# Patient Record
(demographics unavailable — no encounter records)

---

## 2019-08-12 NOTE — XRAY REPORT
CHEST 1 VIEW 8/12/2019 12:00 PM



INDICATION / CLINICAL INFORMATION:

Seizure. Altered mental status. Status post CVA.



COMPARISON: 

None available.



FINDINGS:



SUPPORT DEVICES: None.



HEART / MEDIASTINUM: No significant abnormality. 



LUNGS / PLEURA: No significant pulmonary or pleural abnormality. Small granuloma at the left lung bas
e. No significant pleural effusion. No pneumothorax. 



ADDITIONAL FINDINGS: No significant additional findings.



IMPRESSION:

1. No acute findings.



Signer Name: ANNA Herron MD 

Signed: 8/12/2019 12:47 PM

 Workstation Name: [id number]

## 2019-08-12 NOTE — HISTORY AND PHYSICAL REPORT
History of Present Illness


Chief complaint: 





She cant walk, talk, and she is weak on the right side


History of present illness: 


62 YO Female with BrCA, CVA, HTN, Nicotine Dependence, Asthma presents to ED for

evaluation. Pt is confused and unable to provide detailed history. Pt history is

provided by family members who are at bedside. As per family, the patient was in

her usual state of health at bedtime around 2245hrs. Upon waking from sleep this

morning- the patient complained of headache.  The patient was also  noticed by 

family members to have confusion, unsteady gait, and difficulty speaking, as 

well as unable to  objects in her right hand. Pt transported to [facility] via 

private vehicle. Pt seen and evaluated in ED and found to have symptoms 

consistent with Acute CVA, Encephalopathy. Pt admitted to telemetry and 

initiated on CVA Protocol. Pt transported to CT scan for imaging and was found 

to exhibit seizure activity. Pt treated with antiepileptic therapy. Pt is 

lethargic but is able to protect her airway. No further history obtainable. Pt 

admitted to IMCU. Prior admission on 1/18/16 reviewed. All listed medication 

reconciled at time of admission. 30 minutes additional time dedicated to 

discussing care plan with family. Pt family acknowledge understanding and 

agreement with care plan. 





Past History


Past Medical History: cancer, hypertension, stroke


Past Surgical History: mastectomy


Social history: , smoking


Family history: hypertension





Medications and Allergies


                                    Allergies











Allergy/AdvReac Type Severity Reaction Status Date / Time


 


No Known Allergies Allergy   Unverified 01/18/16 14:20











                                Home Medications











 Medication  Instructions  Recorded  Confirmed  Last Taken  Type


 


hydroCHLOROthiazide [HCTZ] 25 mg PO QDAY 08/12/19 08/12/19 Unknown History











Active Meds: 


Active Medications





Nitroglycerin (Nitrostat)  0.4 mg SL .Q5MIN PRN


   PRN Reason: Chest Pain











Review of Systems


ROS unobtainable: due to mental status





Exam





- Constitutional


Vitals: 


                                        











Temp Pulse Resp BP Pulse Ox


 


    63   13   153/92   99 


 


    08/12/19 12:45  08/12/19 12:45  08/12/19 12:45  08/12/19 12:45











General appearance: Present: mild distress, cachectic





- EENT


Eyes: Present: miosis


ENT: hearing intact, clear oral mucosa





- Neck


Neck: Present: supple, normal ROM





- Respiratory


Respiratory effort: normal


Respiratory: bilateral: CTA





- Cardiovascular


Heart Sounds: Present: S1 & S2.  Absent: rub, click





- Extremities


Extremities: pulses symmetrical, No edema


Peripheral Pulses: within normal limits





- Abdominal


General gastrointestinal: Present: soft, non-tender, non-distended, normal bowel

sounds


Female genitourinary: Present: normal





- Integumentary


Integumentary: Present: clear, warm, dry





- Musculoskeletal


Musculoskeletal: gait normal, strength equal bilaterally





- Psychiatric


Psychiatric: no appropriate mood/affect, no intact judgment & insight, no memory

intact





- Neurologic


Neurologic: CNII-XII intact, focal deficits, moves all extremities, no gait 

normal





Results





- Labs


CBC & Chem 7: 


                                 08/12/19 11:59





                                 08/12/19 11:59


Labs: 


                              Abnormal lab results











  08/12/19 08/12/19 Range/Units





  11:59 12:08 


 


Thrombin Time  14.9 L   (15.1-19.6)  Sec.


 


Acetaminophen   < 5.0 L  (10.0-30.0)  ug/mL














Assessment and Plan





- Patient Problems


(1) CVA (cerebral vascular accident)


Current Visit: No   Status: Acute   


Qualifiers: 


   Laterality of affected vessel: unspecified 


Plan to address problem: 


CVA Protocol: Admit to IMCU, CT head, MRI Brain, MRA Brain, Echo, Carotid 

Doppler, PT/OT/Speech Therapy, Antiplatelet therapy, lipid panel, statin 

therapy, seizure precautions. neuro checks.








(2) Right hemiparesis


Current Visit: Yes   Status: Acute   


Plan to address problem: 


PT consulted, 








(3) Seizure disorder


Current Visit: Yes   Status: Acute   


Plan to address problem: 


Keppra loading in ED, EEG, Neurology consulted, thyroid panel, seizure 

precautions, neuro checks








(4) Encephalopathy


Current Visit: Yes   Status: Acute   


Plan to address problem: 


CT head, neuro checks, seizure precautions, thyroid panel, Urine Drug screen








(5) DVT prophylaxis


Current Visit: Yes   Status: Acute   


Plan to address problem: 


SCD to BLE while in bed, prophylactic heparin

## 2019-08-12 NOTE — VASCULAR LAB REPORT
VL carotid duplex BILAT



INDICATION / CLINICAL INFORMATION:

stroke.



COMPARISON:

None available.



FINDINGS:

Mild intimal thickening but only minimal plaque formation at both bifurcations. Velocity measurements
 and waveform analysis indicate less than 50% stenosis of each internal carotid artery, according to 
massive criteria.



Normal antegrade flow is demonstrated in both vertebral arteries.



IMPRESSION:

1. No significant stenosis. 

 



Signer Name: Martin Sheffield MD 

Signed: 8/12/2019 7:02 PM

 Workstation Name: [id number]

## 2019-08-12 NOTE — CAT SCAN REPORT
CT HEAD WITHOUT CONTRAST



INDICATION : Stroke symptoms.  Dizziness



TECHNIQUE:  Axial imaging performed from the skull apex through the skull base without the use of con
trast.  Sagittal and coronal reformatted images.  All CT scans at this location are performed using C
T dose reduction for ALARA by means of automated exposure control. 



COMPARISON:  None



FINDINGS:  



Parenchyma:  No evidence for acute ischemia, hemorrhage or mass. A chronic 8 mm left pontine infarct 
is identified. The remaining brain parenchyma is within normal limits.

Ventricles:  Ventricles are normal in size and appear symmetric.   

Bones:  No acute osseous abnormality.   

Sinuses:  Sinuses and mastoid air cells are clear.



Soft tissues:  Soft tissues including the orbits appear normal.   



IMPRESSION: No acute abnormality. Chronic left pontine lacunar infarct.



These findings were discussed with Dr. Tejeda in the emergency department at 1216 hours [facility].



Signer Name: Elvin Olivas Jr, MD 

Signed: 8/12/2019 12:18 PM

 Workstation Name: [id number]

## 2019-08-12 NOTE — EMERGENCY DEPARTMENT REPORT
Blank Doc





- Documentation


Documentation: 





63-year-old female that presents with headache, AMS, and right sided weakness.  

Started this morning.





This initial assessment/diagnostic orders/clinical plan/treatment(s) is/are 

subject to change based on patient's health status, clinical progression and re-

assessment by fellow clinical providers in the ED.  Further treatment and workup

at subsequent clinical providers discretion.  Patient/guardians urged not to 

elope from the ED as their condition may be serious if not clinically assessed 

and managed.  Initial orders include:


1- Patient sent to MAIN ED for further evaluation and treatment


2- code stroke protocol

## 2019-08-12 NOTE — EMERGENCY DEPARTMENT REPORT
ED General Adult HPI





- General


Chief complaint: Neuro Symptoms/Deficit


Stated complaint: LIGHTHEADED/DIZZY


Time Seen by Provider: 08/12/19 11:42


Source: patient, family, RN notes reviewed, old records reviewed


Mode of arrival: Wheelchair


Limitations: Physical Limitation





- History of Present Illness


Initial comments: 





This is a 63-year-old female.  The patient is not known to this provider 

previously.  She can't remember the name of her primary care doctor.  Her past 

medical history includes bilateral breast cancer, mastectomy, reconstruction, 

stroke, hypertension, tobacco dependency.





The patient is brought to the hospital by her family for generalized weakness.  

As per verbal report from family, patient typically walks with a walker, and was

in her usual state of health.  Apparently, the patient woke up this morning, 

with a complaint of feeling off balance.  She also complained of a headache.  

The patient is not able to describe the nature of the headache to this provider.

 She was called as a code stroke in triage.


Reportedly, while in the CAT scanner, the patient had a seizure, it was 

nontraumatic, and described as generalized tonic-clonic.  It is now resolved, 

and her Accu-Chek is within normal limits.  Upon arrival back to the ER, the 

patient is awake, but postictal, protecting her airway, and moves 4 extremities 

in response to painful stimuli.








The patient then woke up.  The patient is a poor historian.  She tells me that 

her "heart" is hurting her for 2 weeks.  However, she is not able to describe 

the nature of the pain.  She does not describe radiation, or qualitative nature,

exacerbating or relieving factors.  When asked why she did not seek medical 

attention if she's been having heart pain for 2-3 weeks, the patient was not 

able to provide a lucid response.  Her family states that she's been taking her 

medicines, and she goes to [facility] pharmacy, but they can't recall the name of her 

primary care doctor.  Her family indicates that the patient may have had a 

headache earlier on today, but they're not sure.  The family does not know all 

the patient's medications.  The patient is a poor historian, but denies other 

physical pain at this time.  The patient was seen and evaluated by stroke 

neurology, who deemed the patient to not be a TPA candidate as her last known 

well time was greater than 4.5 hours prior to presentation, and reportedly, her 

symptoms and exam had improved upon their evaluation.  In addition, emergency 

angiographic imaging was not recommended, given the patient's improved exam on 

neurology evaluation.








The patient is resting her stretcher at this time.


-: unknown


Location: chest


Radiation: other


Quality: other


Consistency: other


Improves with: other


Worsens with: other





- Related Data


                                Home Medications











 Medication  Instructions  Recorded  Confirmed  Last Taken


 


hydroCHLOROthiazide [HCTZ] 25 mg PO QDAY 08/12/19 08/12/19 Unknown











                                    Allergies











Allergy/AdvReac Type Severity Reaction Status Date / Time


 


No Known Allergies Allergy   Unverified 01/18/16 14:20














ED Review of Systems


ROS: 


Stated complaint: LIGHTHEADED/DIZZY


Other details as noted in HPI





Comment: Unobtainable due to pts medical conditions


Cardiovascular: chest pain


Neurological: confusion





ED Past Medical Hx





- Past Medical History


Previous Medical History?: Yes


Hx Hypertension: Yes


Hx Asthma: Yes





- Surgical History


Past Surgical History?: No


Additional Surgical History: Radical mastectomy





- Social History


Smoking Status: Current Every Day Smoker





- Medications


Home Medications: 


                                Home Medications











 Medication  Instructions  Recorded  Confirmed  Last Taken  Type


 


hydroCHLOROthiazide [HCTZ] 25 mg PO QDAY 08/12/19 08/12/19 Unknown History














ED Physical Exam





- General


Limitations: Other (patient initially postictal, after resolution of postictal 

status, patient is a poor historian)


General appearance: alert, in no apparent distress





- Head


Head exam: Present: atraumatic, normocephalic





- Eye


Eye exam: Present: normal appearance, EOMI.  Absent: nystagmus





- ENT


ENT exam: Present: normal exam, normal orophraynx, mucous membranes moist, 

normal external ear exam





- Neck


Neck exam: Present: normal inspection, full ROM.  Absent: tenderness, 

meningismus





- Respiratory


Respiratory exam: Present: normal lung sounds bilaterally.  Absent: respiratory 

distress





- Cardiovascular


Cardiovascular Exam: Present: normal rhythm, normal heart sounds.  Absent: 

tachycardia, irregular rhythm, systolic murmur, diastolic murmur, rubs, gallop





- GI/Abdominal


GI/Abdominal exam: Present: soft.  Absent: distended, tenderness, guarding, 

rebound, rigid, pulsatile mass





- Extremities Exam


Extremities exam: Present: normal inspection, full ROM, other (2+ pulses noted 

in the bilateral upper, lower extremities.  Compartments soft.  No long bony 

tenderness.  The pelvis is stable.).  Absent: pedal edema, joint swelling, calf 

tenderness





- Back Exam


Back exam: Present: normal inspection, full ROM.  Absent: tenderness, CVA 

tenderness (R), CVA tenderness (L), paraspinal tenderness, vertebral tenderness





- Neurological Exam


Neurological exam: Present: alert, other (Extraocular movements intact.  Tongue 

midline.  No facial droop.  Facial sensation intact to light touch in the V1, 

V2, V3 distribution bilaterally.  5 and 5 strength in 4 extremities..  Sensation

 is intact to light touch in 4 extremities.).  Absent: motor sensory deficit





- Psychiatric


Psychiatric exam: Present: anxious





- Skin


Skin exam: Present: warm, dry, intact, normal color.  Absent: rash





ED Course


                                   Vital Signs











  08/12/19 08/12/19 08/12/19





  12:13 12:15 12:30


 


Temperature   


 


Pulse Rate 53 L 59 L 55 L


 


Respiratory 12 13 13





Rate   


 


Blood Pressure  145/88 152/87


 


O2 Sat by Pulse 97 98 99





Oximetry   














  08/12/19 08/12/19





  12:45 13:14


 


Temperature  98.4 F


 


Pulse Rate 63 


 


Respiratory 13 





Rate  


 


Blood Pressure 153/92 


 


O2 Sat by Pulse 99 





Oximetry  














ED Medical Decision Making





- Lab Data


Result diagrams: 


                                 08/12/19 11:59





                                 08/12/19 11:59








                                   Vital Signs











  08/12/19 08/12/19 08/12/19





  12:13 12:15 12:30


 


Pulse Rate 53 L 59 L 55 L


 


Respiratory 12 13 13





Rate   


 


Blood Pressure  145/88 152/87


 


O2 Sat by Pulse 97 98 99





Oximetry   














  08/12/19





  12:45


 


Pulse Rate 63


 


Respiratory 13





Rate 


 


Blood Pressure 153/92


 


O2 Sat by Pulse 99





Oximetry 











                                   Lab Results











  08/12/19 08/12/19 08/12/19 Range/Units





  11:52 11:59 11:59 


 


WBC   7.0   (4.5-11.0)  K/mm3


 


RBC   4.31   (3.65-5.03)  M/mm3


 


Hgb   13.4   (10.1-14.3)  gm/dl


 


Hct   40.4   (30.3-42.9)  %


 


MCV   94   (79-97)  fl


 


MCH   31   (28-32)  pg


 


MCHC   33   (30-34)  %


 


RDW   13.3   (13.2-15.2)  %


 


Plt Count   411   (140-440)  K/mm3


 


Lymph % (Auto)   33.3   (13.4-35.0)  %


 


Mono % (Auto)   7.0   (0.0-7.3)  %


 


Eos % (Auto)   2.0   (0.0-4.3)  %


 


Baso % (Auto)   0.8   (0.0-1.8)  %


 


Lymph #   2.3   (1.2-5.4)  K/mm3


 


Mono #   0.5   (0.0-0.8)  K/mm3


 


Eos #   0.1   (0.0-0.4)  K/mm3


 


Baso #   0.1   (0.0-0.1)  K/mm3


 


Seg Neutrophils %   56.9   (40.0-70.0)  %


 


Seg Neutrophils #   4.0   (1.8-7.7)  K/mm3


 


PT    13.7  (12.2-14.9)  Sec.


 


INR    1.08  (0.87-1.13)  


 


APTT    28.2  (24.2-36.6)  Sec.


 


Thrombin Time    14.9 L  (15.1-19.6)  Sec.


 


Sodium     (137-145)  mmol/L


 


Potassium     (3.6-5.0)  mmol/L


 


Chloride     ()  mmol/L


 


Carbon Dioxide     (22-30)  mmol/L


 


Anion Gap     mmol/L


 


BUN     (7-17)  mg/dL


 


Creatinine     (0.7-1.2)  mg/dL


 


Estimated GFR     ml/min


 


BUN/Creatinine Ratio     %


 


Glucose     ()  mg/dL


 


POC Glucose  90    ()  


 


Calcium     (8.4-10.2)  mg/dL


 


Magnesium     (1.7-2.3)  mg/dL


 


Total Bilirubin     (0.1-1.2)  mg/dL


 


AST     (5-40)  units/L


 


ALT     (7-56)  units/L


 


Alkaline Phosphatase     ()  units/L


 


Total Creatine Kinase     ()  units/L


 


CK-MB (CK-2)     (0.0-4.0)  ng/mL


 


CK-MB (CK-2) Rel Index     (0-4)  


 


Troponin T     (0.00-0.029)  ng/mL


 


Total Protein     (6.3-8.2)  g/dL


 


Albumin     (3.9-5)  g/dL


 


Albumin/Globulin Ratio     %


 


Salicylates     (2.8-20.0)  mg/dL


 


Acetaminophen     (10.0-30.0)  ug/mL


 


Plasma/Serum Alcohol     (0-0.07)  %














  08/12/19 08/12/19 08/12/19 Range/Units





  11:59 12:08 12:08 


 


WBC     (4.5-11.0)  K/mm3


 


RBC     (3.65-5.03)  M/mm3


 


Hgb     (10.1-14.3)  gm/dl


 


Hct     (30.3-42.9)  %


 


MCV     (79-97)  fl


 


MCH     (28-32)  pg


 


MCHC     (30-34)  %


 


RDW     (13.2-15.2)  %


 


Plt Count     (140-440)  K/mm3


 


Lymph % (Auto)     (13.4-35.0)  %


 


Mono % (Auto)     (0.0-7.3)  %


 


Eos % (Auto)     (0.0-4.3)  %


 


Baso % (Auto)     (0.0-1.8)  %


 


Lymph #     (1.2-5.4)  K/mm3


 


Mono #     (0.0-0.8)  K/mm3


 


Eos #     (0.0-0.4)  K/mm3


 


Baso #     (0.0-0.1)  K/mm3


 


Seg Neutrophils %     (40.0-70.0)  %


 


Seg Neutrophils #     (1.8-7.7)  K/mm3


 


PT     (12.2-14.9)  Sec.


 


INR     (0.87-1.13)  


 


APTT     (24.2-36.6)  Sec.


 


Thrombin Time     (15.1-19.6)  Sec.


 


Sodium  143    (137-145)  mmol/L


 


Potassium  3.6    (3.6-5.0)  mmol/L


 


Chloride  101.2    ()  mmol/L


 


Carbon Dioxide  30    (22-30)  mmol/L


 


Anion Gap  15    mmol/L


 


BUN  12    (7-17)  mg/dL


 


Creatinine  0.8    (0.7-1.2)  mg/dL


 


Estimated GFR  > 60    ml/min


 


BUN/Creatinine Ratio  15    %


 


Glucose  88    ()  mg/dL


 


POC Glucose     ()  


 


Calcium  9.1    (8.4-10.2)  mg/dL


 


Magnesium   2.20   (1.7-2.3)  mg/dL


 


Total Bilirubin  0.30    (0.1-1.2)  mg/dL


 


AST  18    (5-40)  units/L


 


ALT  13    (7-56)  units/L


 


Alkaline Phosphatase  69    ()  units/L


 


Total Creatine Kinase  92  103   ()  units/L


 


CK-MB (CK-2)  1.1    (0.0-4.0)  ng/mL


 


CK-MB (CK-2) Rel Index  1.1    (0-4)  


 


Troponin T  < 0.010    (0.00-0.029)  ng/mL


 


Total Protein  7.3    (6.3-8.2)  g/dL


 


Albumin  4.1    (3.9-5)  g/dL


 


Albumin/Globulin Ratio  1.3    %


 


Salicylates    7.4  (2.8-20.0)  mg/dL


 


Acetaminophen     (10.0-30.0)  ug/mL


 


Plasma/Serum Alcohol     (0-0.07)  %














  08/12/19 08/12/19 Range/Units





  12:08 12:08 


 


WBC    (4.5-11.0)  K/mm3


 


RBC    (3.65-5.03)  M/mm3


 


Hgb    (10.1-14.3)  gm/dl


 


Hct    (30.3-42.9)  %


 


MCV    (79-97)  fl


 


MCH    (28-32)  pg


 


MCHC    (30-34)  %


 


RDW    (13.2-15.2)  %


 


Plt Count    (140-440)  K/mm3


 


Lymph % (Auto)    (13.4-35.0)  %


 


Mono % (Auto)    (0.0-7.3)  %


 


Eos % (Auto)    (0.0-4.3)  %


 


Baso % (Auto)    (0.0-1.8)  %


 


Lymph #    (1.2-5.4)  K/mm3


 


Mono #    (0.0-0.8)  K/mm3


 


Eos #    (0.0-0.4)  K/mm3


 


Baso #    (0.0-0.1)  K/mm3


 


Seg Neutrophils %    (40.0-70.0)  %


 


Seg Neutrophils #    (1.8-7.7)  K/mm3


 


PT    (12.2-14.9)  Sec.


 


INR    (0.87-1.13)  


 


APTT    (24.2-36.6)  Sec.


 


Thrombin Time    (15.1-19.6)  Sec.


 


Sodium    (137-145)  mmol/L


 


Potassium    (3.6-5.0)  mmol/L


 


Chloride    ()  mmol/L


 


Carbon Dioxide    (22-30)  mmol/L


 


Anion Gap    mmol/L


 


BUN    (7-17)  mg/dL


 


Creatinine    (0.7-1.2)  mg/dL


 


Estimated GFR    ml/min


 


BUN/Creatinine Ratio    %


 


Glucose    ()  mg/dL


 


POC Glucose    ()  


 


Calcium    (8.4-10.2)  mg/dL


 


Magnesium    (1.7-2.3)  mg/dL


 


Total Bilirubin    (0.1-1.2)  mg/dL


 


AST    (5-40)  units/L


 


ALT    (7-56)  units/L


 


Alkaline Phosphatase    ()  units/L


 


Total Creatine Kinase    ()  units/L


 


CK-MB (CK-2)    (0.0-4.0)  ng/mL


 


CK-MB (CK-2) Rel Index    (0-4)  


 


Troponin T    (0.00-0.029)  ng/mL


 


Total Protein    (6.3-8.2)  g/dL


 


Albumin    (3.9-5)  g/dL


 


Albumin/Globulin Ratio    %


 


Salicylates    (2.8-20.0)  mg/dL


 


Acetaminophen  < 5.0 L   (10.0-30.0)  ug/mL


 


Plasma/Serum Alcohol   < 0.01  (0-0.07)  %














- EKG Data


-: EKG Interpreted by Me


EKG shows normal: sinus rhythm


Rate: bradycardia





- EKG Data





08/12/19 13:21


This is a sinus bradycardia, 40 in atraumatic, normal axis, QTC within normal 

limits, poor R wave progression, EKG is not consistent with ST elevation 

myocardial infarction.





- Radiology Data


Radiology results: report reviewed, image reviewed





Noncontrast CT scan of the brain is negative for acute disease.





X-ray of the chest is negative for acute disease.





- Medical Decision Making





Differential diagnosis, including not limited to: Stroke, TIA, seizure, 

pneumonia, urinary tract infection, acute coronary syndrome





Assessment and plan: 63-year-old female, reportedly last known well time was 

sometime last night, reportedly woke up with a reported unsteady gait, not a TPA

 candidate as last known well time exceeds 4.5 hours.  Does not require 

emergency endovascular imaging given that her current NIH score and examination 

are not consistent or suggestive of large vessel occlusion.





Patient is seen and evaluated by stroke neurology, Dr. IVELISSE Kaminski, who has 

recommended admission.  In agreement with Her, and aspirin therapy.  Patient 

also complaining of poorly characterized chest pain for the past 2-3 weeks.  She

 is not hypoxic, she is not tachycardic, she has equal pulses in upper, lower 

extremities, x-ray of the chest is unremarkable, therefore, aortic disease, 

pulmonary embolism is unlikely.





Case is presented to the Hospital physician, Dr. Cross, for further management.


Critical care attestation.: 


If time is entered above; I have spent that time in minutes in the direct care 

of this critically ill patient, excluding procedure time.








ED Disposition


Clinical Impression: 


 Chest pain, Gait instability, Seizure





Disposition: DC-09 OP ADMIT IP TO THIS HOSP


Is pt being admited?: Yes


Does the pt Need Aspirin: Yes


Condition: Stable


Instructions:  Chest Pain (ED)


Referrals: 


PRIMARY CARE,MD [Primary Care Provider] - 3-5 Days

## 2019-08-12 NOTE — CONSULTATION
History of Present Illness


History of present illness: 











[facility]





Impression: 


Patient with episode of dizziness (poorly characterized, ?ataxia) with witnessed

convulsion in ED.  No focal deficit on exam - she is back to baseline.


Rule out new intracranial process, symptomatic seizure.


   


Not a tpa candidate due to: out of window, back to baseline


Not an JAYDON candidate due to:   presentation not suggestive of LVO, no focal 

deficit on exam





Differential Diagnosis:    


1. Cardioembolic stroke 


2. Small vessel disease/lacune 


3. Thromboembolic, artery-to-artery mechanism 


4. Hypercoagulable state-related infarct 


5. Transient ischemic attack 


6. Thrombotic mechanism, large artery disease 


7. Seizure





Comments: 


Door time: 1135


[facility] contacted: 1148


[facility] at bedside: 1154


NIHSS assessment time: 1201 (pt in CT on log in)





Recommendations: 


MRI brain wo


EEG


Toxic/matabolic work up


Seizure precautions


Keppra load in ED (1g given)


inpatient neurology consultation


Inpatient stroke evaluation as per Neurology/ Internal Medicine


Discussed with ED MD





--------------------------------------------------

---------------------------------------





CC: stroke alert





History of Present Illness 


Patient is a63 year old woman with a history of breast cancer, stroke (mild 

right residual weakness), HTN, smoking presenting with dizziness.  Last normal 

prior to bed sometime around midnight.  She woke this morning and felt "dizzy" 

which she states is not lightheadedness or room spinning, rather "whiskey 

headed."  She could not walk and was crawling on the floor and daughter was 

summoned from another room.  Patient seemed confused.  She was brought to the ED

and in CT witnessed to have a 1 min generalized convulsion.  She had a brief 

postictal period of somnolence but is now baseline.





Diagnostic:


CT head wo - nothing acute





Exam:


NIHSS score: 0





 





Medical Decision Making: 


- Extensive number of diagnosis or management options are considered above. 


- Extensive amount of complex data reviewed. 


- High risk of complication and/or morbidity or mortality are associated with 

differential diagnostic considerations above. 


- There may be Uncertain outcome and increased probability of prolonged 

functional impairment or high probability of severe prolonged functional 

impairment associated with some of these differential diagnosis. 





Medical Data Reviewed: 


1.Data reviewed include clinical labs, radiology, Medical Tests; 


2.Tests results discussed w/performing or interpreting physician; 


3.Obtaining/reviewing old medical records; 


4.Obtaining case history from another source; 


5.Independent review of image, tracing or specimen. 








Patient was informed the Neurology Consult would happen via TeleHealth consult 

by way of interactive audio and video telecommunications and consented to 

receiving care in this manner.    











Medications and Allergies


                                    Allergies











Allergy/AdvReac Type Severity Reaction Status Date / Time


 


No Known Allergies Allergy   Unverified 01/18/16 14:20











                                Home Medications











 Medication  Instructions  Recorded  Confirmed  Last Taken  Type


 


ALBUTEROL Inhaler (OR & NICU) 2 puff IH QID PRN #1 inha 01/20/16  Unknown Rx





[ProAir HFA Inhaler]     


 


Aspirin 325 mg PO QDAY #30 tablet 01/20/16  Unknown Rx


 


Cyclobenzaprine [Flexeril 10 MG 10 mg PO QHS #30 tablet 01/20/16  Unknown Rx





TAB]     


 


Lisinopril/Hydrochlorothiazide 1 tab PO QDAY #30 tablet 01/20/16  Unknown Rx





[Zestoretic 20-25 mg]     


 


Simvastatin (Nf) [Zocor TAB] 20 mg PO QHS #30 tablet 01/20/16  Unknown Rx


 


traMADol [Ultram 50 MG tab] 50 mg PO BID PRN #30 tablet 01/20/16  Unknown Rx











Active Meds: 


Active Medications





Levetiracetam (Keppra 1,000 Mg/Ns 0.75% 100ml)  1,000 mg in 100 mls @ 400 mls/hr

IV ONCE ONE


   Stop: 08/12/19 12:17


Sodium Chloride (Nacl 0.9% 500 Ml)  500 mls @ 999 mls/hr IV ONCE ONE


   Stop: 08/12/19 12:33











- Level of Consciousness


1a. Level of Consciousness: alert/keenly responsive





- LOC Questions


1b. LOC Questions: answers both correctly





- LOC Command


1c. LOC Commands: performs tasks correctly





- Best Gaze


2. Best Gaze: normal





- Visual


3. Visual: no visual loss





- Facial Palsy


4. Facial Palsy: normal symmetrical movement





- Motor Arm


5a. Motor Arm Left: no drift


5b. Motor Arm Right: no drift





- Motor Leg


6a. Motor Leg Left: no drift


6b. Motor Leg Right: no drift





- Limb Ataxia


7. Limb Ataxia: absent





- Sensory


8. Sensory: normal





- Best Language


9. Best Language: no aphasia





- Dysarthria


10. Dysarthria: normal





- Extinction and Inattention


11. Extinction/Inattention: no abnormality





- Scoring


Total Score: 0


Stroke Severity: No Stroke Symptoms

## 2019-08-13 NOTE — PROGRESS NOTE
Assessment and Plan


Assessment and plan: 





62 YO Female with BrCA, CVA, HTN, Nicotine Dependence, Asthma presents to ED for

evaluation. Pt is confused and unable to provide detailed history. Pt history is

provided by family members who are at bedside. As per family, the patient was in

her usual state of health at bedtime around 2245hrs. Upon waking from sleep this

morning- the patient complained of headache.  The patient was also  noticed by f

amily members to have confusion, unsteady gait, and difficulty speaking, as well

as unable to  objects in her right hand. Pt transported to [facility] via private 

vehicle. Pt seen and evaluated in ED and found to have symptoms consistent with 

Acute CVA, Encephalopathy. Pt admitted to telemetry and initiated on CVA 

Protocol. Pt transported to CT scan for imaging and was found to exhibit seizure

activity. Pt treated with antiepileptic therapy. Pt is lethargic but is able to 

protect her airway. No further history obtainable. Pt admitted to IM. Prior 

admission on 1/18/16 reviewed. All listed medication reconciled at time of 

admission. 30 minutes additional time dedicated to discussing care plan with 

family. Pt family acknowledge understanding and agreement with care plan. 


(1) CVA (cerebral vascular accident)


- CT head, echo, and carotid Dopplers are unremarkable


- MRI is pending








(2) Right hemiparesis


No worsening weakness from baseline


PT consulted, 








(3) Seizure disorder


- Patient started on Keppra


- Neurology consult


- EEG is pending








(4) Encephalopathy


- Likely due to seizure








(5) DVT prophylaxis


Current Visit: Yes   Status: Acute   


Plan to address problem: 


SCD to BLE while in bed, prophylactic heparin





History


Interval history: 





Patient was seen and evaluated this morning, patient has seizure like 

activities.





Hospitalist Physical





- Physical exam


Narrative exam: 





 Not in cardiopulmonary distress. 


 The patient appeared well nourished and normally developed.


 Vital signs as documented.


 Head exam is unremarkable.


 No scleral icterus .


 Neck is without jugular venous distension, thyromegaly, or carotid bruits. 


 Lungs are clear to auscultation.


Cardiac exam reveals regular rate and  Rhythm. First and second heart sounds 

normal. No murmurs, rubs or gallops. 


Abdominal exam reveals normal bowel sounds, no masses, no organomegaly and no 

aortic enlargement. 


Extremities are nonedematous and both femoral and pedal pulses are normal.


CNS: Alert and oriented 3.  No focal weakness.





- Constitutional


Vitals: 


                                        











Temp Pulse Resp BP Pulse Ox


 


 97.3 F L  60   16   120/71   98 


 


 08/13/19 12:00  08/13/19 12:00  08/13/19 12:00  08/13/19 12:00  08/13/19 12:00











General appearance: Present: mild distress, cachectic





Results





- Labs


CBC & Chem 7: 


                                 08/12/19 11:59





                                 08/12/19 11:59


Labs: 


                             Laboratory Last Values











WBC  7.0 K/mm3 (4.5-11.0)   08/12/19  11:59    


 


RBC  4.31 M/mm3 (3.65-5.03)   08/12/19  11:59    


 


Hgb  13.4 gm/dl (10.1-14.3)   08/12/19  11:59    


 


Hct  40.4 % (30.3-42.9)   08/12/19  11:59    


 


MCV  94 fl (79-97)   08/12/19  11:59    


 


MCH  31 pg (28-32)   08/12/19  11:59    


 


MCHC  33 % (30-34)   08/12/19  11:59    


 


RDW  13.3 % (13.2-15.2)   08/12/19  11:59    


 


Plt Count  411 K/mm3 (140-440)   08/12/19  11:59    


 


Lymph % (Auto)  33.3 % (13.4-35.0)   08/12/19  11:59    


 


Mono % (Auto)  7.0 % (0.0-7.3)   08/12/19  11:59    


 


Eos % (Auto)  2.0 % (0.0-4.3)   08/12/19  11:59    


 


Baso % (Auto)  0.8 % (0.0-1.8)   08/12/19  11:59    


 


Lymph #  2.3 K/mm3 (1.2-5.4)   08/12/19  11:59    


 


Mono #  0.5 K/mm3 (0.0-0.8)   08/12/19  11:59    


 


Eos #  0.1 K/mm3 (0.0-0.4)   08/12/19  11:59    


 


Baso #  0.1 K/mm3 (0.0-0.1)   08/12/19  11:59    


 


Seg Neutrophils %  56.9 % (40.0-70.0)   08/12/19  11:59    


 


Seg Neutrophils #  4.0 K/mm3 (1.8-7.7)   08/12/19  11:59    


 


PT  13.7 Sec. (12.2-14.9)   08/12/19  11:59    


 


INR  1.08  (0.87-1.13)   08/12/19  11:59    


 


APTT  28.2 Sec. (24.2-36.6)   08/12/19  11:59    


 


  14.9 Sec. (15.1-19.6)  L  08/12/19  11:59    


 


Sodium  143 mmol/L (137-145)   08/12/19  11:59    


 


Potassium  3.6 mmol/L (3.6-5.0)   08/12/19  11:59    


 


Chloride  101.2 mmol/L ()   08/12/19  11:59    


 


Carbon Dioxide  30 mmol/L (22-30)   08/12/19  11:59    


 


  15 mmol/L  08/12/19  11:59    


 


BUN  12 mg/dL (7-17)   08/12/19  11:59    


 


  0.8 mg/dL (0.7-1.2)   08/12/19  11:59    


 


Estimated GFR  > 60 ml/min  08/12/19  11:59    


 


  15 %  08/12/19  11:59    


 


Glucose  88 mg/dL ()   08/12/19  11:59    


 


POC Glucose  90  ()   08/12/19  11:52    


 


Calcium  9.1 mg/dL (8.4-10.2)   08/12/19  11:59    


 


Magnesium  2.30 mg/dL (1.7-2.3)   08/12/19  20:07    


 


  0.30 mg/dL (0.1-1.2)   08/12/19  11:59    


 


AST  18 units/L (5-40)   08/12/19  11:59    


 


ALT  13 units/L (7-56)   08/12/19  11:59    


 


  69 units/L ()   08/12/19  11:59    


 


  103 units/L ()   08/12/19  12:08    


 


CK-MB (CK-2)  1.1 ng/mL (0.0-4.0)   08/12/19  11:59    


 


CK-MB (CK-2) Rel Index  1.1  (0-4)   08/12/19  11:59    


 


  < 0.010 ng/mL (0.00-0.029)   08/12/19  11:59    


 


NT-Pro-B Natriuret Pep  20.59 pg/mL (0-900)   08/12/19  20:07    


 


  7.3 g/dL (6.3-8.2)   08/12/19  11:59    


 


  4.1 g/dL (3.9-5)   08/12/19  11:59    


 


  1.3 %  08/12/19  11:59    


 


Triglycerides  90 mg/dL (2-149)   08/13/19  04:26    


 


Cholesterol  191 mg/dL ()   08/13/19  04:26    


 


  144 mg/dL ()  H  08/13/19  04:26    


 


  49 mg/dL (40-59)   08/13/19  04:26    


 


  3.89 %  08/13/19  04:26    


 


TSH  1.310 mlU/mL (0.270-4.200)   08/12/19  20:07    


 


Free T4  1.00 ng/dL (0.76-1.46)   08/12/19  20:07    


 


  Yellow  (Yellow)   08/12/19  12:00    


 


  Clear  (Clear)   08/12/19  12:00    


 


  6.0  (5.0-7.0)   08/12/19  12:00    


 


Ur Specific Gravity  1.009  (1.003-1.030)   08/12/19  12:00    


 


  <15 mg/dl mg/dL (Negative)   08/12/19  12:00    


 


  Neg mg/dL (Negative)   08/12/19  12:00    


 


  Neg mg/dL (Negative)   08/12/19  12:00    


 


  Neg  (Negative)   08/12/19  12:00    


 


  Neg  (Negative)   08/12/19  12:00    


 


  Neg  (Negative)   08/12/19  12:00    


 


  < 2.0 mg/dL (<2.0)   08/12/19  12:00    


 


Ur Leukocyte Esterase  Sm  (Negative)   08/12/19  12:00    


 


  1.0 /HPF (0.0-6.0)   08/12/19  12:00    


 


  2.0 /HPF (0.0-6.0)   08/12/19  12:00    


 


U Epithel Cells (Auto)  6.0 /HPF (0-13.0)   08/12/19  12:00    


 


  1+ /HPF (Negative)   08/12/19  12:00    


 


  Few /HPF  08/12/19  12:00    


 


Salicylates  7.4 mg/dL (2.8-20.0)   08/12/19  12:08    


 


  Presumptive negative   08/12/19  14:27    


 


  Presumptive negative   08/12/19  14:27    


 


Acetaminophen  < 5.0 ug/mL (10.0-30.0)  L  08/12/19  12:08    


 


Ur Barbiturates Screen  Presumptive negative   08/12/19  14:27    


 


Ur Phencyclidine Scrn  Presumptive negative   08/12/19  14:27    


 


Ur Amphetamines Screen  Presumptive negative   08/12/19  14:27    


 


U Benzodiazepines Scrn  Presumptive negative   08/12/19  14:27    


 


  Presumptive negative   08/12/19  14:27    


 


U Marijuana (THC) Screen  Presumptive negative   08/12/19  14:27    


 


  Disclamer   08/12/19  14:27    


 


Plasma/Serum Alcohol  < 0.01 % (0-0.07)   08/12/19  12:08    


 


Blood Type  O POSITIVE   08/12/19  12:00    


 


Antibody Screen  Negative   08/12/19  12:00    














Active Medications





- Current Medications


Current Medications: 














Generic Name Dose Route Start Last Admin





  Trade Name Freq  PRN Reason Stop Dose Admin


 


Acetaminophen  650 mg  08/12/19 13:10  08/13/19 12:15





  Tylenol  PO   650 mg





  Q4H PRN   Administration





  Pain, Mild (1-3)  


 


Atorvastatin Calcium  40 mg  08/12/19 22:00  08/12/19 21:27





  Lipitor  PO   40 mg





  QHS REBEKA   Administration


 


Bisacodyl  10 mg  08/12/19 13:10 





  Dulcolax  FL  





  QDAY PRN  





  Constipation  


 


Heparin Sodium (Porcine)  5,000 unit  08/12/19 22:00  08/13/19 09:23





  Heparin  SUB-Q   5,000 unit





  Q12HR REBEKA   Administration


 


Levetiracetam  500 mg  08/13/19 22:00 





  Keppra  PO  





  BID REBEKA  


 


Magnesium Hydroxide  30 ml  08/12/19 13:10 





  Milk Of Magnesia  PO  





  Q4H PRN  





  Constipation  


 


Metoclopramide HCl  10 mg  08/12/19 13:10 





  Reglan  PO  





  Q6H PRN  





  Nausea And Vomiting  


 


Nicotine  14 mg  08/14/19 11:00 





  Habitrol  TD  





  QDAY REBEKA  


 


Nitroglycerin  0.4 mg  08/12/19 13:04 





  Nitrostat  SL  





  .Q5MIN PRN  





  Chest Pain  


 


Ondansetron HCl  4 mg  08/12/19 13:10 





  Zofran  IV  





  Q8H PRN  





  Nausea And Vomiting  


 


Promethazine HCl  25 mg  08/12/19 13:10 





  Phenergan  FL  





  Q6H PRN  





  Nausea And Vomiting  


 


Sodium Chloride  10 ml  08/12/19 13:10 





  Sodium Chloride Flush Syringe 10 Ml  IV  





  PRN PRN  





  LINE FLUSH

## 2019-08-13 NOTE — CONSULTATION
Past History


Past Medical History: cancer, hypertension, stroke


Past Surgical History: mastectomy


Social history: , smoking


Family history: hypertension





Medications and Allergies


                                    Allergies











Allergy/AdvReac Type Severity Reaction Status Date / Time


 


No Known Allergies Allergy   Unverified 01/18/16 14:20











                                Home Medications











 Medication  Instructions  Recorded  Confirmed  Last Taken  Type


 


hydroCHLOROthiazide [HCTZ] 25 mg PO QDAY 08/12/19 08/12/19 Unknown History











Active Meds: 


Active Medications





Acetaminophen (Tylenol)  650 mg PO Q4H PRN


   PRN Reason: Pain, Mild (1-3)


   Last Admin: 08/13/19 12:15 Dose:  650 mg


   Documented by: 


Atorvastatin Calcium (Lipitor)  40 mg PO QHS [facility]


   Last Admin: 08/12/19 21:27 Dose:  40 mg


   Documented by: 


Bisacodyl (Dulcolax)  10 mg [place] QDAY PRN


   PRN Reason: Constipation


Heparin Sodium (Porcine) (Heparin)  5,000 unit SUB-Q Q12HR [facility]


   Last Admin: 08/13/19 09:23 Dose:  5,000 unit


   Documented by: 


Levetiracetam (Keppra)  500 mg PO BID [facility]


Magnesium Hydroxide (Milk Of Magnesia)  30 ml PO Q4H PRN


   PRN Reason: Constipation


Metoclopramide HCl (Reglan)  10 mg PO Q6H PRN


   PRN Reason: Nausea And Vomiting


Nicotine (Habitrol)  14 mg TD QDAY [facility]


Nitroglycerin (Nitrostat)  0.4 mg SL .Q5MIN PRN


   PRN Reason: Chest Pain


Ondansetron HCl (Zofran)  4 mg IV Q8H PRN


   PRN Reason: Nausea And Vomiting


Promethazine HCl (Phenergan)  25 mg WI Q6H PRN


   PRN Reason: Nausea And Vomiting


Sodium Chloride (Sodium Chloride Flush Syringe 10 Ml)  10 ml IV PRN PRN


   PRN Reason: LINE FLUSH











Physical Examination





- Vital Signs


Vital Signs: 


                                   Vital Signs











Resp


 


 17 


 


 08/12/19 12:00














Results





- Laboratory Findings


CBC and BMP: 


                                 08/12/19 11:59





                                 08/12/19 11:59


Abnormal Lab Findings: 


                                  Abnormal Labs











  08/12/19 08/12/19 08/13/19





  11:59 12:08 04:26


 


Thrombin Time  14.9 L  


 


LDL Cholesterol Direct    144 H


 


Acetaminophen   < 5.0 L

## 2019-08-13 NOTE — CONSULTATION
Past History


Past Medical History: cancer, hypertension, stroke


Past Surgical History: mastectomy


Social history: , smoking


Family history: hypertension





Medications and Allergies


                                    Allergies











Allergy/AdvReac Type Severity Reaction Status Date / Time


 


No Known Allergies Allergy   Unverified 01/18/16 14:20











                                Home Medications











 Medication  Instructions  Recorded  Confirmed  Last Taken  Type


 


hydroCHLOROthiazide [HCTZ] 25 mg PO QDAY 08/12/19 08/12/19 Unknown History











Active Meds: 


Active Medications





Acetaminophen (Tylenol)  650 mg PO Q4H PRN


   PRN Reason: Pain, Mild (1-3)


   Last Admin: 08/13/19 12:15 Dose:  650 mg


   Documented by: 


Atorvastatin Calcium (Lipitor)  40 mg PO QHS [facility]


   Last Admin: 08/12/19 21:27 Dose:  40 mg


   Documented by: 


Bisacodyl (Dulcolax)  10 mg [place] QDAY PRN


   PRN Reason: Constipation


Heparin Sodium (Porcine) (Heparin)  5,000 unit SUB-Q Q12HR [facility]


   Last Admin: 08/13/19 09:23 Dose:  5,000 unit


   Documented by: 


Levetiracetam (Keppra)  500 mg PO BID [facility]


Magnesium Hydroxide (Milk Of Magnesia)  30 ml PO Q4H PRN


   PRN Reason: Constipation


Metoclopramide HCl (Reglan)  10 mg PO Q6H PRN


   PRN Reason: Nausea And Vomiting


Nicotine (Habitrol)  14 mg TD QDAY REBEKA


Nitroglycerin (Nitrostat)  0.4 mg SL .Q5MIN PRN


   PRN Reason: Chest Pain


Ondansetron HCl (Zofran)  4 mg IV Q8H PRN


   PRN Reason: Nausea And Vomiting


Promethazine HCl (Phenergan)  25 mg MI Q6H PRN


   PRN Reason: Nausea And Vomiting


Sodium Chloride (Sodium Chloride Flush Syringe 10 Ml)  10 ml IV PRN PRN


   PRN Reason: LINE FLUSH











Physical Examination





- Vital Signs


Vital Signs: 


                                   Vital Signs











Resp


 


 17 


 


 08/12/19 12:00














Results





- Laboratory Findings


CBC and BMP: 


                                 08/12/19 11:59





                                 08/12/19 11:59


Abnormal Lab Findings: 


                                  Abnormal Labs











  08/12/19 08/12/19 08/13/19





  11:59 12:08 04:26


 


Thrombin Time  14.9 L  


 


LDL Cholesterol Direct    144 H


 


Acetaminophen   < 5.0 L 














Assessment and Plan





Neurologic consult report





Mr. Wayne is a 63-year-old female with history of bilateral breast cancer 

status post surgery, hypertension, nicotine dependence, old stroke) she had left

 pontine infarct with right hemiparesis.  Patient reported feeling dizzy and her

 back on the waking up in the morning on 08/12/2019.  She was brought to the 

emergency room [facility] by family members.  Patient without an

 episode of seizure, generalized tonic-clonic witnessed in the emergency room 

quite going for CT scan of the head.  Patient was found postictal with headache 

and confusion quite in the emergency room.  She was admitted to intensive care 

unit for continuation of care.  Workup including CT scan of the head showed 

chronic left pontine infarct and no new infarct was noted.  Patient had 

echocardiogram did not show any abnormality except for mild mitral 

regurgitation.





Patient's medication.  The patient was not taking aspirin however she stated she

 had been taking BC powder was continued as.  Almost every day.





Showed that patient had increased LDL at 144 patient was not taking any stating.

  However patient stated that she had been taking omega-3 as a supplement.





Physical examination.





Gen.  In no acute distress.  Patient is an appropriate has insight into her 

problems and answers questions appropriately.





Speech.  Speech is normal.





Heart.  Normal rate and rhyth


Records.  No palpable, no bruits.





Cranial nerves.  All cranial nerves are within normal limits normal, no  facial 

asymmetry was noted.


Motor.  Patient has weak right upper and lower extremity as compared to the left

 probably as a residual from previous stroke


Reflexes.  Patient has increased reflexes on the right upper and lower extremity

 with upgoing toe on the right side.





Sensory.  Sensory examination was grossly within normal limits.





Impression.  Patient reported dizziness and headache on awakening was probably 

from post ictal state following a seizure while asleep.





   Patient also had an witnessed  seizure  in the emergency department


   


   Recommendation. 1, Please start her on Keppra 500 mg by mouth twice a day


                           2.  Please get an EEG. 


                                   3.  Patient should be started on aspirin 81 

mg by mouth daily and on statin.


                                   4.  She should get PT and OT for right 

residual weakness

## 2019-08-14 NOTE — DISCHARGE SUMMARY
Providers





- Providers


Date of Admission: 


08/12/19 13:11





Attending physician: 


VIELKA TURNER MD





                                        





08/12/19 12:03


Consult to Physician [CONS] Urgent 


   Comment: 


   Consulting Provider: RUTH ANN NARANJO


   Physician Instructions: 


   Reason For Exam: cva sv sz





08/12/19 13:11


Occupational Therapy Evaluate and Treat [CONS] Routine 


   Comment: 


   Reason For Exam: Neuro deficits


Physical Therapy Evaluation and Treat [CONS] Routine 


   Comment: 


   Reason For Exam: Neuro deficits





08/12/19 18:35


Consult to Physician [CONS] Routine 


   Comment: 


   Consulting Provider: SIMI PRATT


   Physician Instructions: 


   Reason For Exam: CVA











Primary care physician: 


JULIA SLATER








Hospitalization


Reason for admission: Seizure, hx of stroke


Condition: Good


Pertinent studies: 





CT, MRI head


Doppler


Echo


Hospital course: 





62 YO Female with BrCA, CVA, HTN, Nicotine Dependence, Asthma presents to ED for

 evaluation. Pt is confused and unable to provide detailed history. Pt history 

is provided by family members who are at bedside. As per family, the patient was

 in her usual state of health at bedtime around 2245hrs. Upon waking from sleep 

this morning- the patient complained of headache.  The patient was also  noticed

 by family members to have confusion, unsteady gait, and difficulty speaking, as

 well as unable to  objects in her right hand. Pt transported to [facility] via 

private vehicle. Pt seen and evaluated in ED and found to have symptoms 

consistent with Acute CVA, Encephalopathy. Pt admitted to telemetry and 

initiated on CVA Protocol. Pt transported to CT scan for imaging and was found 

to exhibit seizure activity. Pt treated with antiepileptic therapy. Pt is 

lethargic but is able to protect her airway. No further history obtainable. Pt 

admitted to IMCU. Prior admission on 1/18/16 reviewed. All listed medication 

reconciled at time of admission. 30 minutes additional time dedicated to 

discussing care plan with family. Pt family acknowledge understanding and 

agreement with care plan. 


  Patient was admitted to the hospital and CVA workup was negative.  Patient was

 evaluated by neurologist and her presentation looks like stroke.  She was 

started with keppra and discharged home.  Patient advised not to drive before 

she was advised by her PCP/neurologist.  Patient is hemodynamically stabilized 

on discharge.


Disposition: DC-01 TO HOME OR SELFCARE


Time spent for discharge: 32 minutes





- Discharge Diagnoses


(1) Right hemiparesis


Status: Chronic   





(2) Seizure disorder


Status: Acute   





(3) CVA (cerebral vascular accident)


Status: Chronic   


Qualifiers: 


   Laterality of affected vessel: unspecified 





Core Measure Documentation





- Palliative Care


Palliative Care/ Comfort Measures: Not Applicable





- Core Measures


Any of the following diagnoses?: history only





Exam





- Physical Exam


Narrative exam: 





 Not in cardiopulmonary distress. 


 The patient appeared well nourished and normally developed.


 Vital signs as documented.


 Head exam is unremarkable.


 No scleral icterus .


 Neck is without jugular venous distension, thyromegaly, or carotid bruits. 


 Lungs are clear to auscultation.


Cardiac exam reveals regular rate and  Rhythm. 


Abdominal exam reveals normal bowel sounds. 


Extremities are nonedematous and both femoral and pedal pulses are normal.


CNS: Alert and oriented 3.  right sided hemiparesis.





- Constitutional


Vitals: 


                                        











Temp Pulse Resp BP Pulse Ox


 


 97.5 F L  55 L  18   101/69   98 


 


 08/14/19 07:50  08/14/19 03:57  08/14/19 08:00  08/14/19 07:50  08/14/19 08:12














Plan


Activity: advance as tolerated


Weight Bearing Status: Weight Bear as Tolerated


Diet: low cholesterol, low salt


Follow up with: 


PRIMARY CARE,MD [Referring] - 3-5 Days


OZ WHEELER MD [Staff Physician] - 7 Days


Prescriptions: 


AtorvaSTATin [Lipitor] 40 mg PO QHS #30 tablet


Nicotine [Habitrol] 14 mg TD QDAY #30 patch


levETIRAcetam [Keppra TAB] 500 mg PO BID #60 tablet

## 2019-08-14 NOTE — MAGNETIC RESONANCE REPORT
MR BRAIN WITHOUT CONTRAST



HISTORY: Stroke.



TECHNIQUE: Multisequence, multiplanar MRI without IV gadolinium.



COMPARISON: CT head dated 8/12/2019. MR brain dated 1/18/2016.



FINDINGS:



No evidence for diffusion restriction, hemorrhage, mass or extra-axial fluid collection.



Chronic 8 mm left pontine infarct is identified. No large chronic infarct.



The remaining brain parenchyma demonstrates normal signal on all sequences. The gray-white interface 
is well-defined.



The posterior fossa and contents are unremarkable.



Ventricular size is normal. The basal cisterns are patent. Normal flow voids are identified near the 
[place].



Minimal mucosal thickening is noted in the paranasal sinuses. The mastoid air cells are clear.



IMPRESSION:

No acute intracranial process.

Chronic 8 mm left pontine infarct.



Signer Name: Elvin Olivas Jr, MD 

Signed: 8/14/2019 2:03 PM

 Workstation Name: [id number]

## 2019-08-14 NOTE — MAGNETIC RESONANCE REPORT
MRA HEAD WITHOUT CONTRAST



HISTORY: Stroke, seizure



COMPARISON: 1/19/2016



TECHNIQUE: Routine MRA of the head is performed. 3-D/MIP reformats postprocessed.



CONTRAST: None.



FINDINGS:

Intracranial vertebral arteries: No significant abnormality.

Basilar artery: No significant abnormality.

Posterior cerebral arteries: No significant abnormality.



Intracranial internal carotid arteries: No significant abnormality.

Anterior cerebral arteries: No significant abnormality.

Middle cerebral arteries: No significant abnormality.



Additional findings: None. 



IMPRESSION:

1. No significant abnormality.



Signer Name: Elvin Olivas Jr, MD 

Signed: 8/14/2019 2:05 PM

 Workstation Name: [id number]